# Patient Record
Sex: FEMALE | Race: WHITE | NOT HISPANIC OR LATINO | Employment: UNEMPLOYED | URBAN - NONMETROPOLITAN AREA
[De-identification: names, ages, dates, MRNs, and addresses within clinical notes are randomized per-mention and may not be internally consistent; named-entity substitution may affect disease eponyms.]

---

## 2024-03-12 ENCOUNTER — HOSPITAL ENCOUNTER (EMERGENCY)
Facility: HOSPITAL | Age: 19
Discharge: HOME | End: 2024-03-12
Attending: STUDENT IN AN ORGANIZED HEALTH CARE EDUCATION/TRAINING PROGRAM | Admitting: STUDENT IN AN ORGANIZED HEALTH CARE EDUCATION/TRAINING PROGRAM
Payer: COMMERCIAL

## 2024-03-12 VITALS
DIASTOLIC BLOOD PRESSURE: 89 MMHG | HEART RATE: 94 BPM | TEMPERATURE: 98 F | SYSTOLIC BLOOD PRESSURE: 124 MMHG | WEIGHT: 206.4 LBS | OXYGEN SATURATION: 99 % | HEIGHT: 62 IN | RESPIRATION RATE: 18 BRPM | BODY MASS INDEX: 37.98 KG/M2

## 2024-03-12 DIAGNOSIS — R03.0 ELEVATED BLOOD PRESSURE READING: Primary | ICD-10-CM

## 2024-03-12 DIAGNOSIS — I47.9 PAROXYSMAL TACHYCARDIA, UNSPECIFIED: ICD-10-CM

## 2024-03-12 PROCEDURE — 99283 EMERGENCY DEPT VISIT LOW MDM: CPT

## 2024-03-12 PROCEDURE — 93005 ELECTROCARDIOGRAM TRACING: CPT | Performed by: STUDENT IN AN ORGANIZED HEALTH CARE EDUCATION/TRAINING PROGRAM

## 2024-03-12 PROCEDURE — 93005 ELECTROCARDIOGRAM TRACING: CPT

## 2024-03-12 RX ORDER — ERGOCALCIFEROL 1.25 MG/1
50000 CAPSULE ORAL WEEKLY
COMMUNITY

## 2024-03-12 RX ORDER — ATENOLOL 25 MG/1
25 TABLET ORAL DAILY
COMMUNITY
Start: 2024-03-05 | End: 2025-03-05

## 2024-03-12 RX ORDER — CHLORAL HYDRATE 500 MG
CAPSULE ORAL
COMMUNITY

## 2024-03-13 NOTE — ED PROVIDER NOTES
" EMERGENCY DEPARTMENT ENCOUNTER    Pt Name: Yaniv Velazquez  MRN: [id number]  Pt :   2005  Room Number:    Date of encounter:  3/12/2024  PCP: Ximena Stevens PA-C  ED Provider: Omar Watson MD    Historian: Patient      HPI:  Chief Complaint: Hypertension        Context: Yaniv Velazquez is a 18 y.o. female who presents to the ED c/o hypertension.  Patient states that she is currently on atenolol for hypertension and tachycardia.  States that today she has felt more tired than usual and checked her blood pressure noting that it was high with systolic above 160.  Only takes her atenolol in the mornings.  Also noted that her heart rate was high so she came to emergency department.  Was feeling very anxious but is no longer feeling anxious.  Feels much better and states that her heart rate normalized on its own.  States she had blood work within the past 2 weeks that was \"normal\" and does not want any blood work done today.  Denies any chest pain.  Denies any cough or difficulty breathing.  No other symptoms reported at this time.      PAST MEDICAL HISTORY  Past Medical History:   Diagnosis Date    Hypertension     Menorrhagia with irregular cycle     On Depo-Provera for contraception          PAST SURGICAL HISTORY  Past Surgical History:   Procedure Laterality Date    NO PAST SURGERIES           FAMILY HISTORY  Family History   Problem Relation Age of Onset    No Known Problems Father     No Known Problems Mother     No Known Problems Brother     No Known Problems Sister     No Known Problems Son     No Known Problems Daughter     No Known Problems Paternal Grandfather     No Known Problems Paternal Grandmother     No Known Problems Maternal Grandmother     No Known Problems Maternal Grandfather     No Known Problems Maternal Aunt     No Known Problems Maternal Uncle     No Known Problems Paternal Aunt     No Known Problems Paternal Uncle     No Known Problems Other          SOCIAL HISTORY  Social History "     Socioeconomic History    Marital status:    Tobacco Use    Smoking status: Never    Smokeless tobacco: Never   Vaping Use    Vaping status: Never Used   Substance and Sexual Activity    Alcohol use: Never    Drug use: Never    Sexual activity: Yes     Partners: Male     Birth control/protection: Injection         ALLERGIES  Latex, Peanut-containing drug products, and Penicillins        REVIEW OF SYSTEMS  Review of Systems     All systems reviewed and negative except for those discussed in HPI.       PHYSICAL EXAM    I have reviewed the triage vital signs and nursing notes.    ED Triage Vitals [03/12/24 2107]   Temp Heart Rate Resp BP SpO2   98.6 °F (37 °C) (!) 146 20 150/94 100 %      Temp src Heart Rate Source Patient Position BP Location FiO2 (%)   Oral Monitor Sitting Left arm --       Physical Exam    General:  Awake, alert, no acute distress  HEENT: Atraumatic, normocephalic, EOMI, PERRLA, mucous membranes moist  NECK:  Supple, atraumatic  Cardiovascular:  Regular rate, regular rhythm, no murmurs, rubs, or gallops.  Extremities well perfused   Respiratory:  Regular rate, clear lungs to auscultation bilaterally.  No rhonchi, rales, wheezing  Abdominal:  Soft, nondistended, nontender.  No guarding or rebound.  No palpable masses  Extremity:  No visible bony abnormalities in all 4 extremities.  Full range of motion of all extremities.  Skin:  Warm and dry.  No rashes  Neuro:  AAOx3, GCS 15. Cranial nerves 2-12 grossly intact.  No focal strength or sensation deficits.  Psych:  Mood and affect appropriate.        LAB RESULTS  No results found for this or any previous visit (from the past 24 hour(s)).    If labs were ordered, I independently reviewed the results and considered them in treating the patient.        RADIOLOGY  No Radiology Exams Resulted Within Past 24 Hours    I ordered and independently reviewed the above noted radiographic studies.     See radiologist's dictation for official  interpretation.        PROCEDURES    Procedures    ECG 12 Lead Tachycardia   Final Result          MEDICATIONS GIVEN IN ER    Medications - No data to display      MEDICAL DECISION MAKING, PROGRESS, and CONSULTS    All labs, if obtained, have been independently reviewed by me.  All radiology studies, if obtained, have been reviewed by me and the radiologist dictating the report.  All EKG's, if obtained, have been independently viewed and interpreted by me.      Discussion below represents my analysis of pertinent findings related to patient's condition, differential diagnosis, treatment plan and final disposition.    Yaniv Velazquez is a 18 y.o. female who presents to the ED c/o hypertension.  Hypertensive on arrival but mentating appropriately.  Was also tachycardic in triage but heart rate normalized before my evaluation once patient was roomed.  EKG obtained was personally reviewed and interpreted showing sinus tachycardia with rate of 115 at around 9:49 PM.  Then by 11:22 PM blood pressure completely normalized to 124/89 and heart rate normalized to 94.  Medications indicated.  Patient declined any blood work stating she already has had blood work by her PCP and cardiologist within the past 2 weeks and has appointment for follow-up with cardiologist for reviewing Holter monitor that she wore within the past month.  Advised on return precautions.  Patient agreeable with this plan and requesting to be discharged home.                                 Orders placed during this visit:  Orders Placed This Encounter   Procedures    ECG 12 Lead Tachycardia         ED Course:    Consultants:                  Shared Decision Making:  After my consideration of clinical presentation and any laboratory/radiology studies obtained, I discussed the findings with the patient/patient representative who is in agreement with the treatment plan and the final disposition.   Risks and benefits of discharge and/or observation/admission  were discussed.      AS OF 23:30 EDT VITALS:    BP - 124/89  HR - 94  TEMP - 98 °F (36.7 °C) (Oral)  O2 SATS - 99%                  DIAGNOSIS  Final diagnoses:   Elevated blood pressure reading   Paroxysmal tachycardia, unspecified         DISPOSITION  Discharge      Please note that portions of this document were completed with voice recognition software.        Omar Watson MD  03/12/24 0501

## 2024-03-13 NOTE — DISCHARGE INSTRUCTIONS
Follow-up closely with primary care provider and cardiologist as an outpatient.  Return to emergency department symptoms continue to worsen.

## 2025-02-14 ENCOUNTER — CONSULT (OUTPATIENT)
Dept: ONCOLOGY | Facility: CLINIC | Age: 20
End: 2025-02-14
Payer: COMMERCIAL

## 2025-02-14 VITALS
SYSTOLIC BLOOD PRESSURE: 138 MMHG | RESPIRATION RATE: 18 BRPM | HEIGHT: 62 IN | TEMPERATURE: 97.7 F | WEIGHT: 193.2 LBS | DIASTOLIC BLOOD PRESSURE: 99 MMHG | OXYGEN SATURATION: 100 % | HEART RATE: 57 BPM | BODY MASS INDEX: 35.55 KG/M2

## 2025-02-14 DIAGNOSIS — D50.8 OTHER IRON DEFICIENCY ANEMIA: ICD-10-CM

## 2025-02-14 DIAGNOSIS — D69.6 THROMBOCYTOPENIA: Primary | ICD-10-CM

## 2025-02-14 DIAGNOSIS — D73.89 SPLENIC LESION: ICD-10-CM

## 2025-02-14 PROBLEM — D50.9 IRON DEFICIENCY ANEMIA: Status: ACTIVE | Noted: 2025-02-14

## 2025-02-14 PROCEDURE — 1125F AMNT PAIN NOTED PAIN PRSNT: CPT | Performed by: INTERNAL MEDICINE

## 2025-02-14 PROCEDURE — 99204 OFFICE O/P NEW MOD 45 MIN: CPT | Performed by: INTERNAL MEDICINE

## 2025-02-14 RX ORDER — HYDROXYZINE HYDROCHLORIDE 10 MG/1
10 TABLET, FILM COATED ORAL AS NEEDED
COMMUNITY
Start: 2025-02-04

## 2025-02-14 RX ORDER — FAMOTIDINE 20 MG/1
20 TABLET, FILM COATED ORAL AS NEEDED
COMMUNITY
Start: 2025-02-04

## 2025-02-14 NOTE — PROGRESS NOTES
New Patient Office Visit      Date: 2025     Patient Name: Yaniv Velazquez  MRN: [id number]  : 2005  Referring Physician: Ximena Stevens    Chief Complaint: Establish care for iron deficiency anemia and splenic lesion    History of Present Illness: Yaniv Velazquez is a pleasant 19 y.o. female with past medical history of thrombocytopenia, anxiety, endometriosis who presents today for evaluation of iron deficiency anemia and splenic lesion. The patient has been following with hematology in [place] as well as her PCP.  She has had intermittent iron deficiency over the past several years due to heavy menses.  She received IV iron in the summer and tolerated it well.  Iron studies in 2025 reviewed and within normal limits.  Of note, she had an ultrasound completed in 2024 which noted an enlarged spleen to 16.8 cm as well as a lesion next to the spleen concerning for accessory spleen versus a possible right renal lesion.  CT abdomen/pelvis in 2024 without evidence of splenomegaly or right kidney lesion.  Per multiple lab draws, her platelet count has ranged between 100K-150K.  She notes that this has been present for many years.  Denies any significant bleeding or bruising episodes.    Oncology History:    Oncology/Hematology History    No history exists.       Subjective      Review of Systems:     Constitutional: Negative for fevers, chills, or weight loss  Eyes: Negative for blurred vision or discharge         Ear/Nose/Throat: Negative for difficulty swallowing, sore throat, LAD                                                       Respiratory: Negative for cough, SOA, wheezing                                                                                        Cardiovascular: Negative for chest pain or palpitations                                                                  Gastrointestinal: Negative for nausea, vomiting or diarrhea                                                                      Genitourinary: Negative for dysuria or hematuria                                                                                           Musculoskeletal: Negative for any joint pains or muscle aches                                                                        Neurologic: Negative for any weakness, headaches, dizziness                                                                         Hematologic: Negative for any easy bleeding or bruising                                                                                   Psychiatric: Negative for anxiety or depression                             Past Medical History:   Past Medical History:   Diagnosis Date    Hypertension     Menorrhagia with irregular cycle     On Depo-Provera for contraception        Past Surgical History:   Past Surgical History:   Procedure Laterality Date    NO PAST SURGERIES         Family History:   Family History   Problem Relation Age of Onset    No Known Problems Father     No Known Problems Mother     No Known Problems Brother     No Known Problems Sister     No Known Problems Son     No Known Problems Daughter     No Known Problems Paternal Grandfather     No Known Problems Paternal Grandmother     No Known Problems Maternal Grandmother     No Known Problems Maternal Grandfather     No Known Problems Maternal Aunt     No Known Problems Maternal Uncle     No Known Problems Paternal Aunt     No Known Problems Paternal Uncle     No Known Problems Other        Social History:   Social History     Socioeconomic History    Marital status:    Tobacco Use    Smoking status: Never    Smokeless tobacco: Never   Vaping Use    Vaping status: Never Used   Substance and Sexual Activity    Alcohol use: Never    Drug use: Never    Sexual activity: Yes     Partners: Male     Birth control/protection: Injection       Medications:     Current Outpatient Medications:     atenolol (TENORMIN) 25 MG tablet, Take 1  "tablet by mouth Daily., Disp: , Rfl:     famotidine (PEPCID) 20 MG tablet, Take 1 tablet by mouth As Needed., Disp: , Rfl:     hydrOXYzine (ATARAX) 10 MG tablet, Take 1 tablet by mouth As Needed., Disp: , Rfl:     Probiotic Product (PROBIOTIC PO), Take  by mouth Daily., Disp: , Rfl:     vitamin D (ERGOCALCIFEROL) 1.25 MG (45671 UT) capsule capsule, Take 1 capsule by mouth 1 (One) Time Per Week., Disp: , Rfl:     levonorgestrel-ethinyl estradiol (SEASONALE) 0.15-0.03 MG per tablet, Take 1 tablet by mouth Daily., Disp: 91 tablet, Rfl: 4    Allergies:   Allergies   Allergen Reactions    Ketorolac Palpitations    Latex Palpitations    Penicillins Hives, Itching and Rash    Statins Swelling    Ketorolac Tromethamine Other (See Comments)     Tachycardia    Diphenhydramine Other (See Comments) and Palpitations     Tachycardia    Peanut-Containing Drug Products Swelling       Objective     Physical Exam:  Vital Signs:   Vitals:    02/14/25 1015   BP: 138/99   Pulse: 57   Resp: 18   Temp: 97.7 °F (36.5 °C)   TempSrc: Temporal   SpO2: 100%   Weight: 87.6 kg (193 lb 3.2 oz)   Height: 157.5 cm (62.01\")   PainSc:   6   PainLoc: Comment: legs     Pain Score    02/14/25 1015   PainSc:   6   PainLoc: Comment: legs     ECOG Performance Status: 0 - Asymptomatic    Constitutional: NAD, ECOG 0  Eyes: PERRLA, scleral anicteric  ENT: No LAD, no thyromegaly  Respiratory: CTAB, no wheezing, rales, rhonchi  Cardiovascular: RRR, no murmurs, pulses 2+ bilaterally  Abdomen: soft, NT/ND, no HSM  Musculoskeletal: strength 5/5 bilaterally, no c/c/e  Neurologic: A&O x 3, CN II-XII intact grossly  Psych: mood and affect congruent, no SI or HI    Results Review:   No visits with results within 2 Week(s) from this visit.   Latest known visit with results is:   Admission on 10/24/2023, Discharged on 10/24/2023   Component Date Value Ref Range Status    Glucose 10/24/2023 115 (H)  65 - 99 mg/dL Final    BUN 10/24/2023 10  6 - 20 mg/dL Final    " Creatinine 10/24/2023 0.85  0.57 - 1.00 mg/dL Final    Sodium 10/24/2023 138  136 - 145 mmol/L Final    Potassium 10/24/2023 3.9  3.5 - 5.2 mmol/L Final    Chloride 10/24/2023 103  98 - 107 mmol/L Final    CO2 10/24/2023 25.2  22.0 - 29.0 mmol/L Final    Calcium 10/24/2023 9.8  8.6 - 10.5 mg/dL Final    Total Protein 10/24/2023 7.9  6.0 - 8.5 g/dL Final    Albumin 10/24/2023 4.9  3.5 - 5.2 g/dL Final    ALT (SGPT) 10/24/2023 34 (H)  1 - 33 U/L Final    AST (SGOT) 10/24/2023 24  1 - 32 U/L Final    Alkaline Phosphatase 10/24/2023 96  43 - 101 U/L Final    Total Bilirubin 10/24/2023 0.3  0.0 - 1.2 mg/dL Final    Globulin 10/24/2023 3.0  gm/dL Final    A/G Ratio 10/24/2023 1.6  g/dL Final    BUN/Creatinine Ratio 10/24/2023 11.8  7.0 - 25.0 Final    Anion Gap 10/24/2023 9.8  5.0 - 15.0 mmol/L Final    eGFR 10/24/2023 102.0  >60.0 mL/min/1.73 Final    HS Troponin T 10/24/2023 <6  <10 ng/L Final    Extra Tube 10/24/2023 Hold for add-ons.   Final    Auto resulted.    Extra Tube 10/24/2023 hold for add-on   Final    Auto resulted    Extra Tube 10/24/2023 Hold for add-ons.   Final    Auto resulted.    Extra Tube 10/24/2023 Hold for add-ons.   Final    Auto resulted    WBC 10/24/2023 10.42  3.40 - 10.80 10*3/mm3 Final    RBC 10/24/2023 5.34 (H)  3.77 - 5.28 10*6/mm3 Final    Hemoglobin 10/24/2023 14.0  12.0 - 15.9 g/dL Final    Hematocrit 10/24/2023 43.5  34.0 - 46.6 % Final    MCV 10/24/2023 81.5  79.0 - 97.0 fL Final    MCH 10/24/2023 26.2 (L)  26.6 - 33.0 pg Final    MCHC 10/24/2023 32.2  31.5 - 35.7 g/dL Final    RDW 10/24/2023 13.2  12.3 - 15.4 % Final    RDW-SD 10/24/2023 38.5  37.0 - 54.0 fl Final    MPV 10/24/2023 13.2 (H)  6.0 - 12.0 fL Final    Platelets 10/24/2023 124 (L)  140 - 450 10*3/mm3 Final    Neutrophil % 10/24/2023 73.3  42.7 - 76.0 % Final    Lymphocyte % 10/24/2023 20.8  19.6 - 45.3 % Final    Monocyte % 10/24/2023 4.6 (L)  5.0 - 12.0 % Final    Eosinophil % 10/24/2023 0.4  0.3 - 6.2 % Final    Basophil  % 10/24/2023 0.3  0.0 - 1.5 % Final    Immature Grans % 10/24/2023 0.6 (H)  0.0 - 0.5 % Final    Neutrophils, Absolute 10/24/2023 7.64 (H)  1.70 - 7.00 10*3/mm3 Final    Lymphocytes, Absolute 10/24/2023 2.17  0.70 - 3.10 10*3/mm3 Final    Monocytes, Absolute 10/24/2023 0.48  0.10 - 0.90 10*3/mm3 Final    Eosinophils, Absolute 10/24/2023 0.04  0.00 - 0.40 10*3/mm3 Final    Basophils, Absolute 10/24/2023 0.03  0.00 - 0.20 10*3/mm3 Final    Immature Grans, Absolute 10/24/2023 0.06 (H)  0.00 - 0.05 10*3/mm3 Final    nRBC 10/24/2023 0.0  0.0 - 0.2 /100 WBC Final    HS Troponin T 10/24/2023 <6  <10 ng/L Final    Troponin T Numeric Delta 10/24/2023    Final    Unable to calculate.    D-Dimer, Quantitative 10/24/2023 <0.27  0.00 - 0.50 MCGFEU/mL Final       No results found.    Assessment / Plan      Assessment/Plan:   1. Thrombocytopenia (Primary)  -Platelet count stable between 100K-150K over the past several years  -Could be related to ITP vs mild splenomegaly  -No further hematologic workup required unless her platelet count drops below 50K    2. Splenic lesion  -Incidentally noted in July 2024 on ultrasound  -CT abdomen/pelvis in November 2024 without evidence of splenomegaly or accessory spleen/renal lesion  -Repeat CT scan pending per patient    3. Other iron deficiency anemia  -Has previously received IV iron due to intolerance of oral iron.  Most recently in July 2024  -Iron studies in February 2025 reviewed and within normal limits.  -No indication for further IV iron       Follow Up:   Follow-up as needed     Aj Armijo MD  Hematology and Oncology     Please note that portions of this note may have been completed with a voice recognition program. Efforts were made to edit the dictations, but occasionally words are mistranscribed.

## 2025-05-27 ENCOUNTER — TELEPHONE (OUTPATIENT)
Dept: OBSTETRICS AND GYNECOLOGY | Facility: CLINIC | Age: 20
End: 2025-05-27

## 2025-05-27 NOTE — TELEPHONE ENCOUNTER
Caller: Yaniv Singh    Relationship: Self    Best call back number: [phone]    Who is your current provider: DR TOTH    Is your current provider offboarding? NO    Who would you like your new provider to be: DR MURRAY    What are your reasons for transferring care: A LOT OF FRIENDS THAT LIKE DR MURRAY AND IS WANTING TO GIVE HIM A CHANCE.     Additional notes: PATIENT IS NEEDING TO SCHEDULE NEW OB - PATIENT IS REQUESTING TO TRANSFER CARE TO DR MURRAY. POSITIVE HOME PREGNANCY TEST. LMP 04/19/25 - PATIENT STATES LAST OB ADVISED SHE NEEDED INJECTIONS OR SHE WOULD HAVE A MISCARRIAGE.

## 2025-05-28 ENCOUNTER — TELEPHONE (OUTPATIENT)
Dept: OBSTETRICS AND GYNECOLOGY | Facility: CLINIC | Age: 20
End: 2025-05-28

## 2025-05-28 NOTE — TELEPHONE ENCOUNTER
Pt called in states she needs blood thinner injections if she ever became pregnant from previous provider, I did explain to patent she has to be seen by a provider inorder scehdule an appt for new ob/ u/s , I tried to schedule future appts. Pt declined LMP 4/19/2025.